# Patient Record
Sex: FEMALE | Race: WHITE | NOT HISPANIC OR LATINO | URBAN - METROPOLITAN AREA
[De-identification: names, ages, dates, MRNs, and addresses within clinical notes are randomized per-mention and may not be internally consistent; named-entity substitution may affect disease eponyms.]

---

## 2019-03-12 PROBLEM — 428283002 HISTORY OF POLYP OF COLON (SITUATION): Status: ACTIVE | Noted: 2019-03-12

## 2019-03-12 PROBLEM — 266435005 GASTRO-ESOPHAGEAL REFLUX DISEASE WITHOUT ESOPHAGITIS: Status: ACTIVE | Noted: 2019-03-12

## 2019-03-12 PROBLEM — 59621000 ESSENTIAL HYPERTENSION: Status: ACTIVE | Noted: 2019-03-12

## 2019-03-12 PROBLEM — 711150003 LONG-TERM CURRENT USE OF ANTICOAGULANT: Status: ACTIVE | Noted: 2019-03-12

## 2019-03-12 PROBLEM — 414916001 OBESITY: Status: ACTIVE | Noted: 2019-03-12

## 2022-04-30 ENCOUNTER — TELEPHONE ENCOUNTER (OUTPATIENT)
Dept: URBAN - METROPOLITAN AREA CLINIC 121 | Facility: CLINIC | Age: 72
End: 2022-04-30

## 2022-04-30 RX ORDER — DEXTROAMPHETAMINE SACCHARATE, AMPHETAMINE ASPARTATE, DEXTROAMPHETAMINE SULFATE, AND AMPHETAMINE SULFATE 2.5; 2.5; 2.5; 2.5 MG/1; MG/1; MG/1; MG/1
QD TABLET ORAL
OUTPATIENT
Start: 2012-11-08

## 2022-04-30 RX ORDER — OMEPRAZOLE 20 MG/1
1 CAPSULE PO QAM CAPSULE, DELAYED RELEASE ORAL
OUTPATIENT
Start: 2015-02-27

## 2022-04-30 RX ORDER — OMEPRAZOLE 20 MG/1
1 CAPSULE PO QAM CAPSULE, DELAYED RELEASE ORAL
OUTPATIENT
Start: 2015-02-27 | End: 2019-03-12

## 2022-04-30 RX ORDER — DEXTROAMPHETAMINE SACCHARATE, AMPHETAMINE ASPARTATE, DEXTROAMPHETAMINE SULFATE, AND AMPHETAMINE SULFATE 5; 5; 5; 5 MG/1; MG/1; MG/1; MG/1
QD TABLET ORAL
OUTPATIENT
Start: 2012-11-08

## 2022-04-30 RX ORDER — DEXTROAMPHETAMINE SACCHARATE, AMPHETAMINE ASPARTATE, DEXTROAMPHETAMINE SULFATE, AND AMPHETAMINE SULFATE 5; 5; 5; 5 MG/1; MG/1; MG/1; MG/1
QD TABLET ORAL
OUTPATIENT
Start: 2012-11-08 | End: 2019-03-12

## 2022-05-01 ENCOUNTER — TELEPHONE ENCOUNTER (OUTPATIENT)
Dept: URBAN - METROPOLITAN AREA CLINIC 121 | Facility: CLINIC | Age: 72
End: 2022-05-01

## 2022-05-01 RX ORDER — ELECTROLYTES/DEXTROSE
QD SOLUTION, ORAL ORAL
Status: ACTIVE | COMMUNITY
Start: 2012-11-08

## 2022-05-01 RX ORDER — METOPROLOL TARTRATE 25 MG/1
TABLET, FILM COATED ORAL
Status: ACTIVE | COMMUNITY
Start: 2019-03-12

## 2022-05-01 RX ORDER — ALPRAZOLAM 0.5 MG/1
PRN TABLET, EXTENDED RELEASE ORAL
Status: ACTIVE | COMMUNITY
Start: 2012-11-08

## 2022-05-01 RX ORDER — CHOLECALCIFEROL (VITAMIN D3) 25 MCG
QD TABLET,CHEWABLE ORAL
Status: ACTIVE | COMMUNITY
Start: 2012-11-08

## 2022-05-01 RX ORDER — IBUPROFEN 200 MG
CAPSULE ORAL
Status: ACTIVE | COMMUNITY
Start: 2012-11-08

## 2022-05-01 RX ORDER — SACUBITRIL AND VALSARTAN 24; 26 MG/1; MG/1
TABLET, FILM COATED ORAL
Status: ACTIVE | COMMUNITY
Start: 2019-03-12

## 2022-05-01 RX ORDER — SERTRALINE HCL 100 MG
QD TABLET ORAL
Status: ACTIVE | COMMUNITY
Start: 2012-11-08

## 2022-05-01 RX ORDER — APIXABAN 2.5 MG/1
TABLET, FILM COATED ORAL
Status: ACTIVE | COMMUNITY
Start: 2019-03-12

## 2022-05-01 RX ORDER — CYANOCOBALAMIN 1000 UG/ML
INJECT SUBQ ONCE A WEEK X 4 WEEKS, THEN DECREASE TO ONCE MONTH THEREAFTER INJECTION INTRAMUSCULAR; SUBCUTANEOUS
Status: ACTIVE | COMMUNITY
Start: 2019-12-19

## 2022-05-11 ENCOUNTER — OFFICE VISIT (OUTPATIENT)
Dept: URBAN - METROPOLITAN AREA CLINIC 27 | Facility: CLINIC | Age: 72
End: 2022-05-11
Payer: MEDICARE

## 2022-05-11 ENCOUNTER — WEB ENCOUNTER (OUTPATIENT)
Dept: URBAN - METROPOLITAN AREA CLINIC 27 | Facility: CLINIC | Age: 72
End: 2022-05-11

## 2022-05-11 VITALS
WEIGHT: 200 LBS | SYSTOLIC BLOOD PRESSURE: 138 MMHG | HEART RATE: 68 BPM | DIASTOLIC BLOOD PRESSURE: 81 MMHG | HEIGHT: 68 IN | BODY MASS INDEX: 30.31 KG/M2

## 2022-05-11 DIAGNOSIS — K22.70 BARRETT'S ESOPHAGUS WITHOUT DYSPLASIA: ICD-10-CM

## 2022-05-11 DIAGNOSIS — R19.4 CHANGE IN BOWEL HABITS: ICD-10-CM

## 2022-05-11 PROCEDURE — 99213 OFFICE O/P EST LOW 20 MIN: CPT | Performed by: INTERNAL MEDICINE

## 2022-05-11 PROCEDURE — 99253 IP/OBS CNSLTJ NEW/EST LOW 45: CPT | Performed by: INTERNAL MEDICINE

## 2022-05-11 RX ORDER — CYANOCOBALAMIN 1000 UG/ML
INJECT SUBQ ONCE A WEEK X 4 WEEKS, THEN DECREASE TO ONCE MONTH THEREAFTER INJECTION INTRAMUSCULAR; SUBCUTANEOUS
Status: DISCONTINUED | COMMUNITY
Start: 2019-12-19

## 2022-05-11 RX ORDER — METOPROLOL TARTRATE 25 MG/1
TABLET, FILM COATED ORAL
Status: ACTIVE | COMMUNITY
Start: 2019-03-12

## 2022-05-11 RX ORDER — OMEPRAZOLE 40 MG/1
1 CAPSULE 30 MINUTES BEFORE MORNING MEAL CAPSULE, DELAYED RELEASE ORAL ONCE A DAY
Status: ACTIVE | COMMUNITY

## 2022-05-11 RX ORDER — SACUBITRIL AND VALSARTAN 24; 26 MG/1; MG/1
TABLET, FILM COATED ORAL
Status: ACTIVE | COMMUNITY
Start: 2019-03-12

## 2022-05-11 RX ORDER — IBUPROFEN 200 MG
CAPSULE ORAL
Status: ACTIVE | COMMUNITY
Start: 2012-11-08

## 2022-05-11 RX ORDER — ELECTROLYTES/DEXTROSE
QD SOLUTION, ORAL ORAL
Status: ACTIVE | COMMUNITY
Start: 2012-11-08

## 2022-05-11 RX ORDER — CHOLECALCIFEROL (VITAMIN D3) 25 MCG
QD TABLET,CHEWABLE ORAL
Status: DISCONTINUED | COMMUNITY
Start: 2012-11-08

## 2022-05-11 RX ORDER — APIXABAN 2.5 MG/1
TABLET, FILM COATED ORAL
Status: ACTIVE | COMMUNITY
Start: 2019-03-12

## 2022-05-11 RX ORDER — SERTRALINE HCL 100 MG
QD TABLET ORAL
Status: ACTIVE | COMMUNITY
Start: 2012-11-08

## 2022-05-11 RX ORDER — ALPRAZOLAM 0.5 MG/1
PRN TABLET, EXTENDED RELEASE ORAL
Status: ACTIVE | COMMUNITY
Start: 2012-11-08

## 2022-05-11 NOTE — PHYSICAL EXAM CHEST:
no lesions,  no deformities,  no traumatic injuries,  no significant scars are present,  chest wall non-tender,  no masses present, breathing is unlabored without accessory muscle use, normal breath sounds

## 2022-05-11 NOTE — HPI-TODAY'S VISIT:
The patient was referred by Dr. Ciro Wilkes for surveillance of Jesus's esophagus. A copy of this document is being forwarded to the referring provider. Her last EGD was in 2019. She takes Omeprazole 40 mg q day. She denies heartburn. She reports irregular bowel habits which she describes as vary frequency and consistency. She denies nausea, vomiting, abd pain, constipation, melena, hematochezia.

## 2022-05-23 PROBLEM — 302914006 BARRETT'S ESOPHAGUS: Status: ACTIVE | Noted: 2019-03-12

## 2022-06-06 ENCOUNTER — OFFICE VISIT (OUTPATIENT)
Dept: URBAN - METROPOLITAN AREA SURGERY CENTER 7 | Facility: SURGERY CENTER | Age: 72
End: 2022-06-06

## 2022-06-09 ENCOUNTER — CLAIMS CREATED FROM THE CLAIM WINDOW (OUTPATIENT)
Dept: URBAN - METROPOLITAN AREA SURGERY CENTER 7 | Facility: SURGERY CENTER | Age: 72
End: 2022-06-09
Payer: MEDICARE

## 2022-06-09 DIAGNOSIS — K22.70 BARRETT ESOPHAGUS: ICD-10-CM

## 2022-06-09 DIAGNOSIS — K44.9 DIAPHRAGMATIC HERNIA: ICD-10-CM

## 2022-06-09 DIAGNOSIS — Z98.84 BARIATRIC SURG STAT-UNSP: ICD-10-CM

## 2022-06-09 PROCEDURE — 43239 EGD BIOPSY SINGLE/MULTIPLE: CPT | Performed by: INTERNAL MEDICINE

## 2022-06-09 PROCEDURE — G8907 PT DOC NO EVENTS ON DISCHARG: HCPCS | Performed by: INTERNAL MEDICINE

## 2022-06-09 PROCEDURE — 43239 EGD BIOPSY SINGLE/MULTIPLE: CPT | Performed by: CLINIC/CENTER

## 2022-06-09 PROCEDURE — G8907 PT DOC NO EVENTS ON DISCHARG: HCPCS | Performed by: CLINIC/CENTER

## 2022-06-09 RX ORDER — OMEPRAZOLE 40 MG/1
1 CAPSULE 30 MINUTES BEFORE MORNING MEAL CAPSULE, DELAYED RELEASE ORAL ONCE A DAY
Status: ACTIVE | COMMUNITY

## 2022-06-09 RX ORDER — SERTRALINE HCL 100 MG
QD TABLET ORAL
Status: ACTIVE | COMMUNITY
Start: 2012-11-08

## 2022-06-09 RX ORDER — APIXABAN 2.5 MG/1
TABLET, FILM COATED ORAL
Status: ACTIVE | COMMUNITY
Start: 2019-03-12

## 2022-06-09 RX ORDER — SACUBITRIL AND VALSARTAN 24; 26 MG/1; MG/1
TABLET, FILM COATED ORAL
Status: ACTIVE | COMMUNITY
Start: 2019-03-12

## 2022-06-09 RX ORDER — ALPRAZOLAM 0.5 MG/1
PRN TABLET, EXTENDED RELEASE ORAL
Status: ACTIVE | COMMUNITY
Start: 2012-11-08

## 2022-06-09 RX ORDER — ELECTROLYTES/DEXTROSE
QD SOLUTION, ORAL ORAL
Status: ACTIVE | COMMUNITY
Start: 2012-11-08

## 2022-06-09 RX ORDER — METOPROLOL TARTRATE 25 MG/1
TABLET, FILM COATED ORAL
Status: ACTIVE | COMMUNITY
Start: 2019-03-12

## 2022-06-09 RX ORDER — IBUPROFEN 200 MG
CAPSULE ORAL
Status: ACTIVE | COMMUNITY
Start: 2012-11-08

## 2022-09-27 ENCOUNTER — CLAIMS CREATED FROM THE CLAIM WINDOW (OUTPATIENT)
Dept: URBAN - METROPOLITAN AREA CLINIC 27 | Facility: CLINIC | Age: 72
End: 2022-09-27
Payer: MEDICARE

## 2022-09-27 ENCOUNTER — DASHBOARD ENCOUNTERS (OUTPATIENT)
Age: 72
End: 2022-09-27

## 2022-09-27 VITALS
TEMPERATURE: 97.3 F | HEART RATE: 72 BPM | DIASTOLIC BLOOD PRESSURE: 83 MMHG | HEIGHT: 68 IN | WEIGHT: 200 LBS | SYSTOLIC BLOOD PRESSURE: 130 MMHG | BODY MASS INDEX: 30.31 KG/M2

## 2022-09-27 DIAGNOSIS — Z86.010 PERSONAL HISTORY OF COLONIC POLYPS: ICD-10-CM

## 2022-09-27 DIAGNOSIS — R10.31 RIGHT LOWER QUADRANT ABDOMINAL PAIN: ICD-10-CM

## 2022-09-27 PROCEDURE — 99214 OFFICE O/P EST MOD 30 MIN: CPT | Performed by: PHYSICIAN ASSISTANT

## 2022-09-27 PROCEDURE — 99214 OFFICE O/P EST MOD 30 MIN: CPT | Performed by: INTERNAL MEDICINE

## 2022-09-27 RX ORDER — ALPRAZOLAM 0.5 MG/1
PRN TABLET, EXTENDED RELEASE ORAL
Status: ACTIVE | COMMUNITY
Start: 2012-11-08

## 2022-09-27 RX ORDER — ELECTROLYTES/DEXTROSE
QD SOLUTION, ORAL ORAL
Status: ACTIVE | COMMUNITY
Start: 2012-11-08

## 2022-09-27 RX ORDER — SACUBITRIL AND VALSARTAN 24; 26 MG/1; MG/1
TABLET, FILM COATED ORAL
Status: ACTIVE | COMMUNITY
Start: 2019-03-12

## 2022-09-27 RX ORDER — IBUPROFEN 200 MG
CAPSULE ORAL
Status: ACTIVE | COMMUNITY
Start: 2012-11-08

## 2022-09-27 RX ORDER — OMEPRAZOLE 40 MG/1
1 CAPSULE 30 MINUTES BEFORE MORNING MEAL CAPSULE, DELAYED RELEASE ORAL ONCE A DAY
Status: ACTIVE | COMMUNITY

## 2022-09-27 RX ORDER — METOPROLOL TARTRATE 25 MG/1
TABLET, FILM COATED ORAL
Status: ACTIVE | COMMUNITY
Start: 2019-03-12

## 2022-09-27 RX ORDER — SERTRALINE HCL 100 MG
QD TABLET ORAL
Status: ACTIVE | COMMUNITY
Start: 2012-11-08

## 2022-09-27 RX ORDER — DICYCLOMINE HYDROCHLORIDE 20 MG/1
1 HALF OR 1 TABLET TABLET ORAL THREE TIMES A DAY
Qty: 30 | Refills: 1 | OUTPATIENT
Start: 2022-09-27 | End: 2022-11-25

## 2022-09-27 RX ORDER — APIXABAN 2.5 MG/1
TABLET, FILM COATED ORAL
Status: ACTIVE | COMMUNITY
Start: 2019-03-12

## 2022-09-27 NOTE — HPI-TODAY'S VISIT:
Ms. Jimenez is a 72-year-old female established patient of Dr. Sung presenting for evaluation of right-sided abdominal pain.  She reports intermittent, mild, RLQ pain for the last 3 wks. She notices the discomfort primarily when she is still lying down. No association w/eating, BMs. No precipitating events such as heavy lifting. She has had no F/C, N/V, change in bowels, hematochezia. Weight is stable. Pain has started to improve over the last week. SurgHx is significant for gastric bypass; she has all her GYN organs. She denies any urinary sx but is currently undergoing treatment for urinary incontinence for the las 3-4 mos; treatment involves nerve stimulation through the R ankle, seems to be helping.  EGD June 2022 for Jesus's surveillance:Jesus's esophagus, gastric bypass, small hiatal hernia; no dysplasia; recall 3 years Colonoscopy 2019:Diverticulosis, 1 small polyp, cecal AVM; recall 5 years given family history of colon polyps   FH: father and sister had colon polyps

## 2023-10-16 NOTE — PHYSICAL EXAM NEUROLOGIC:
oriented to person, place and time , normal sensation , short and long term memory intact Island Pedicle Flap Text: The defect edges were debeveled with a #15 scalpel blade.  Given the location of the defect, shape of the defect and the proximity to free margins an island pedicle advancement flap was deemed most appropriate.  Using a sterile surgical marker, an appropriate advancement flap was drawn incorporating the defect, outlining the appropriate donor tissue and placing the expected incisions within the relaxed skin tension lines where possible.    The area thus outlined was incised deep to adipose tissue with a #15 scalpel blade.  The skin margins were undermined to an appropriate distance in all directions around the primary defect and laterally outward around the island pedicle utilizing iris scissors.  There was minimal undermining beneath the pedicle flap. The skin margins were undermined to an appropriate distance in all directions utilizing iris scissors and carried over to close the primary defect.